# Patient Record
Sex: FEMALE | Race: WHITE | NOT HISPANIC OR LATINO
[De-identification: names, ages, dates, MRNs, and addresses within clinical notes are randomized per-mention and may not be internally consistent; named-entity substitution may affect disease eponyms.]

---

## 2019-04-24 ENCOUNTER — APPOINTMENT (OUTPATIENT)
Dept: ULTRASOUND IMAGING | Facility: CLINIC | Age: 59
End: 2019-04-24
Payer: MEDICAID

## 2019-04-24 ENCOUNTER — OUTPATIENT (OUTPATIENT)
Dept: OUTPATIENT SERVICES | Facility: HOSPITAL | Age: 59
LOS: 1 days | End: 2019-04-24

## 2019-04-24 PROBLEM — Z00.00 ENCOUNTER FOR PREVENTIVE HEALTH EXAMINATION: Status: ACTIVE | Noted: 2019-04-24

## 2019-04-24 PROCEDURE — 93979 VASCULAR STUDY: CPT

## 2019-05-03 ENCOUNTER — CLINICAL ADVICE (OUTPATIENT)
Age: 59
End: 2019-05-03

## 2019-05-06 ENCOUNTER — APPOINTMENT (OUTPATIENT)
Dept: CARDIOLOGY | Facility: CLINIC | Age: 59
End: 2019-05-06
Payer: MEDICAID

## 2019-05-06 VITALS
HEART RATE: 91 BPM | HEIGHT: 61 IN | OXYGEN SATURATION: 97 % | DIASTOLIC BLOOD PRESSURE: 70 MMHG | WEIGHT: 113 LBS | BODY MASS INDEX: 21.34 KG/M2 | SYSTOLIC BLOOD PRESSURE: 122 MMHG

## 2019-05-06 DIAGNOSIS — Z82.5 FAMILY HISTORY OF ASTHMA AND OTHER CHRONIC LOWER RESPIRATORY DISEASES: ICD-10-CM

## 2019-05-06 DIAGNOSIS — Z80.0 FAMILY HISTORY OF MALIGNANT NEOPLASM OF DIGESTIVE ORGANS: ICD-10-CM

## 2019-05-06 DIAGNOSIS — Z78.9 OTHER SPECIFIED HEALTH STATUS: ICD-10-CM

## 2019-05-06 PROCEDURE — 99244 OFF/OP CNSLTJ NEW/EST MOD 40: CPT

## 2019-05-06 RX ORDER — LEVOTHYROXINE SODIUM 0.05 MG/1
50 TABLET ORAL DAILY
Refills: 0 | Status: ACTIVE | COMMUNITY

## 2019-05-06 RX ORDER — BUDESONIDE AND FORMOTEROL FUMARATE DIHYDRATE 160; 4.5 UG/1; UG/1
160-4.5 AEROSOL RESPIRATORY (INHALATION)
Refills: 0 | Status: ACTIVE | COMMUNITY

## 2019-05-06 NOTE — PHYSICAL EXAM
[Normal Appearance] : normal appearance [General Appearance - Well Developed] : well developed [Well Groomed] : well groomed [General Appearance - Well Nourished] : well nourished [No Deformities] : no deformities [General Appearance - In No Acute Distress] : no acute distress [Normal Conjunctiva] : the conjunctiva exhibited no abnormalities [Eyelids - No Xanthelasma] : the eyelids demonstrated no xanthelasmas [Normal Oral Mucosa] : normal oral mucosa [No Oral Pallor] : no oral pallor [No Oral Cyanosis] : no oral cyanosis [Heart Sounds] : normal S1 and S2 [Heart Rate And Rhythm] : heart rate and rhythm were normal [Murmurs] : no murmurs present [Arterial Pulses Normal] : the arterial pulses were normal [Respiration, Rhythm And Depth] : normal respiratory rhythm and effort [Exaggerated Use Of Accessory Muscles For Inspiration] : no accessory muscle use [Auscultation Breath Sounds / Voice Sounds] : lungs were clear to auscultation bilaterally [Abdomen Soft] : soft [Abdomen Tenderness] : non-tender [Abnormal Walk] : normal gait [Gait - Sufficient For Exercise Testing] : the gait was sufficient for exercise testing [Cyanosis, Localized] : no localized cyanosis [Nail Clubbing] : no clubbing of the fingernails [Skin Color & Pigmentation] : normal skin color and pigmentation [] : no rash [No Venous Stasis] : no venous stasis [Oriented To Time, Place, And Person] : oriented to person, place, and time [Affect] : the affect was normal [Mood] : the mood was normal [No Anxiety] : not feeling anxious [FreeTextEntry1] : No Carotid bruit. No JVD

## 2019-05-06 NOTE — PHYSICAL EXAM
[General Appearance - Well Developed] : well developed [Normal Appearance] : normal appearance [Well Groomed] : well groomed [General Appearance - Well Nourished] : well nourished [No Deformities] : no deformities [General Appearance - In No Acute Distress] : no acute distress [Normal Conjunctiva] : the conjunctiva exhibited no abnormalities [Eyelids - No Xanthelasma] : the eyelids demonstrated no xanthelasmas [Normal Oral Mucosa] : normal oral mucosa [No Oral Pallor] : no oral pallor [No Oral Cyanosis] : no oral cyanosis [Heart Sounds] : normal S1 and S2 [Heart Rate And Rhythm] : heart rate and rhythm were normal [Murmurs] : no murmurs present [Arterial Pulses Normal] : the arterial pulses were normal [Respiration, Rhythm And Depth] : normal respiratory rhythm and effort [Exaggerated Use Of Accessory Muscles For Inspiration] : no accessory muscle use [Auscultation Breath Sounds / Voice Sounds] : lungs were clear to auscultation bilaterally [Abdomen Soft] : soft [Abnormal Walk] : normal gait [Abdomen Tenderness] : non-tender [Gait - Sufficient For Exercise Testing] : the gait was sufficient for exercise testing [Cyanosis, Localized] : no localized cyanosis [Nail Clubbing] : no clubbing of the fingernails [Skin Color & Pigmentation] : normal skin color and pigmentation [] : no rash [No Venous Stasis] : no venous stasis [Oriented To Time, Place, And Person] : oriented to person, place, and time [Mood] : the mood was normal [Affect] : the affect was normal [No Anxiety] : not feeling anxious [FreeTextEntry1] : No Carotid bruit. No JVD

## 2019-05-06 NOTE — REASON FOR VISIT
[FreeTextEntry1] : Mandy is a pleasant 59-year-old female with history of chronic smoking, COPD, well controlled on bronchodilators, hypothyroid on supplements and recent Holter recording showed up to 2 second pauses at night time. \par \par The patient does not have any palpitations, dizziness, near syncope or syncope. No past history of A. fib or sick sinus. No history of sleep apnea.\par \par Cardiac consultation was requested by Kael Lino NP

## 2019-05-17 ENCOUNTER — APPOINTMENT (OUTPATIENT)
Dept: CARDIOLOGY | Facility: CLINIC | Age: 59
End: 2019-05-17
Payer: MEDICAID

## 2019-05-17 PROCEDURE — 93015 CV STRESS TEST SUPVJ I&R: CPT

## 2019-05-17 PROCEDURE — 93306 TTE W/DOPPLER COMPLETE: CPT

## 2019-05-29 ENCOUNTER — APPOINTMENT (OUTPATIENT)
Dept: CARDIOLOGY | Facility: CLINIC | Age: 59
End: 2019-05-29
Payer: MEDICAID

## 2019-05-29 PROCEDURE — A9502: CPT

## 2019-05-29 PROCEDURE — 93015 CV STRESS TEST SUPVJ I&R: CPT

## 2019-05-29 PROCEDURE — 78452 HT MUSCLE IMAGE SPECT MULT: CPT

## 2019-05-29 RX ORDER — OXYCODONE HYDROCHLORIDE 5 MG/1
5 CAPSULE ORAL
Refills: 0 | Status: DISCONTINUED | COMMUNITY
End: 2019-05-29

## 2019-05-30 ENCOUNTER — APPOINTMENT (OUTPATIENT)
Dept: CARDIOLOGY | Facility: CLINIC | Age: 59
End: 2019-05-30
Payer: MEDICAID

## 2019-05-30 VITALS
WEIGHT: 113 LBS | HEIGHT: 61 IN | OXYGEN SATURATION: 97 % | HEART RATE: 72 BPM | BODY MASS INDEX: 21.34 KG/M2 | SYSTOLIC BLOOD PRESSURE: 116 MMHG | DIASTOLIC BLOOD PRESSURE: 60 MMHG

## 2019-05-30 PROCEDURE — 99215 OFFICE O/P EST HI 40 MIN: CPT

## 2019-05-30 NOTE — ASSESSMENT
[FreeTextEntry1] : Review today:\par \par -  Nuclear stress test May 2019: Exercise 4 minutes of Malvin protocol. EKG changes suggestive of ischemia. Small and mild apical lateral ischemia on SPECT scan\par - Normal LV function. RVSP 25. Interatrial septum aneurysm. Mild diastolic dysfunction.\par \par \par Plan:\par Chronic smoker, COPD: She has some shortness of breath on exertion. No recent COPD exacerbation.\par \par Coronary risk factors include hypercholesteremia and chronic smoking. No history of diabetes or hypertension.\par \par Holter recording showed sinus pause at nighttime, less than 3 seconds. Patient does not have symptoms of dizziness or near syncope. No palpitations.\par \par The above stress test and echocardiogram were discussed. Patient has abnormal EKG response to exercise which was limited, very small defect on the SPECT scan in the apical lateral region. Slight ischemia cannot be excluded. Patient is asymptomatic for angina.\par \par We discussed risk and benefits and limitations for  further evaluation and management with cardiac catheter and possible PCI versus medical therapy. The patient prefers medical therapy. \par \par She should be scheduled for hip surgery. \par \par She will start statins. Check LFT, LDL and CK after one month. Lab slip was given. Start low-dose metoprolol. Because of history of nighttime sinus pause, we will start at 12.5 mg. Continue aspirin.\par \par Quit smoking. Start nicotine patch. Smoking cessation is essential for prevention of future cardiovascular events.\par \par Overall, the patient is at mild-to-moderate risk for major cardiovascular events from the anticipated hip surgery. Medical treatment versus interventional management imbrication would yield similar long-term outcome as well as similar perioperative cardiovascular risk.\par \par \par \par Sincerely,\par Bishop Carbajal MD, FACC, SANDY

## 2019-05-30 NOTE — PHYSICAL EXAM
[General Appearance - Well Developed] : well developed [Normal Appearance] : normal appearance [Well Groomed] : well groomed [General Appearance - Well Nourished] : well nourished [No Deformities] : no deformities [General Appearance - In No Acute Distress] : no acute distress [Normal Conjunctiva] : the conjunctiva exhibited no abnormalities [Eyelids - No Xanthelasma] : the eyelids demonstrated no xanthelasmas [Normal Oral Mucosa] : normal oral mucosa [No Oral Pallor] : no oral pallor [No Oral Cyanosis] : no oral cyanosis [FreeTextEntry1] : No Carotid bruit. No JVD  [Respiration, Rhythm And Depth] : normal respiratory rhythm and effort [Exaggerated Use Of Accessory Muscles For Inspiration] : no accessory muscle use [Auscultation Breath Sounds / Voice Sounds] : lungs were clear to auscultation bilaterally [Heart Rate And Rhythm] : heart rate and rhythm were normal [Heart Sounds] : normal S1 and S2 [Murmurs] : no murmurs present [Arterial Pulses Normal] : the arterial pulses were normal [Abdomen Soft] : soft [Abdomen Tenderness] : non-tender [Abnormal Walk] : normal gait [Gait - Sufficient For Exercise Testing] : the gait was sufficient for exercise testing [Nail Clubbing] : no clubbing of the fingernails [Cyanosis, Localized] : no localized cyanosis [Skin Color & Pigmentation] : normal skin color and pigmentation [] : no rash [No Venous Stasis] : no venous stasis [Oriented To Time, Place, And Person] : oriented to person, place, and time [Affect] : the affect was normal [Mood] : the mood was normal [No Anxiety] : not feeling anxious

## 2019-05-30 NOTE — REASON FOR VISIT
[FreeTextEntry1] : Mandy is a pleasant 59-year-old female with history of chronic smoking, COPD, well controlled on bronchodilators, hypothyroid on supplements and recent Holter recording showed up to 2 second pauses at night time. \par \par The patient does not have any palpitations, dizziness, near syncope or syncope. No past history of A. fib or sick sinus. No history of sleep apnea.\par \par Cardiac consultation was requested by Kael Lino NP\par \par She had nuclear stress testing which was not normal.

## 2019-06-03 ENCOUNTER — APPOINTMENT (OUTPATIENT)
Dept: CARDIOLOGY | Facility: CLINIC | Age: 59
End: 2019-06-03

## 2019-06-03 ENCOUNTER — MEDICATION RENEWAL (OUTPATIENT)
Age: 59
End: 2019-06-03

## 2019-06-03 RX ORDER — ATORVASTATIN CALCIUM 20 MG/1
20 TABLET, FILM COATED ORAL DAILY
Qty: 90 | Refills: 0 | Status: DISCONTINUED | COMMUNITY
Start: 2019-05-30 | End: 2019-06-03

## 2019-06-06 ENCOUNTER — OUTPATIENT (OUTPATIENT)
Dept: OUTPATIENT SERVICES | Facility: HOSPITAL | Age: 59
LOS: 1 days | End: 2019-06-06
Payer: MEDICAID

## 2019-06-06 PROCEDURE — 93458 L HRT ARTERY/VENTRICLE ANGIO: CPT | Mod: 26

## 2019-06-07 PROCEDURE — 93010 ELECTROCARDIOGRAM REPORT: CPT

## 2019-06-10 ENCOUNTER — APPOINTMENT (OUTPATIENT)
Dept: CARDIOLOGY | Facility: CLINIC | Age: 59
End: 2019-06-10
Payer: MEDICAID

## 2019-06-10 ENCOUNTER — NON-APPOINTMENT (OUTPATIENT)
Age: 59
End: 2019-06-10

## 2019-06-10 VITALS
HEART RATE: 66 BPM | BODY MASS INDEX: 20.01 KG/M2 | DIASTOLIC BLOOD PRESSURE: 64 MMHG | HEIGHT: 61 IN | SYSTOLIC BLOOD PRESSURE: 118 MMHG | WEIGHT: 106 LBS

## 2019-06-10 DIAGNOSIS — Z01.810 ENCOUNTER FOR PREPROCEDURAL CARDIOVASCULAR EXAMINATION: ICD-10-CM

## 2019-06-10 PROCEDURE — 93000 ELECTROCARDIOGRAM COMPLETE: CPT

## 2019-06-10 PROCEDURE — 99214 OFFICE O/P EST MOD 30 MIN: CPT

## 2019-06-10 NOTE — ASSESSMENT
[FreeTextEntry1] : Review today:\par -  Nuclear stress test May 2019: Exercise 4 minutes of Malvin protocol. EKG changes suggestive of ischemia. Small and mild apical lateral ischemia on SPECT scan\par - Normal LV function. RVSP 25. Interatrial septum aneurysm. Mild diastolic dysfunction.\par \par \par Plan:\par Chronic smoker, COPD: She has some shortness of breath on exertion. No recent COPD exacerbation.\par \par Coronary risk factors include hypercholesteremia and chronic smoking. No history of diabetes or hypertension.\par \par Holter recording showed sinus pause at nighttime, less than 3 seconds. Patient does not have symptoms of dizziness or near syncope. No palpitations.\par \par Abnormal stress test. Very slight defect in the apical lateral region. Followup cardiac catheter showed minimal disease only.  Patient is asymptomatic for angina.\par \par She should be scheduled for hip surgery. \par \par She will start statins. Check LFT, LDL and CK after one month. Lab slip was given. \par \par DC Metoprolol.  Continue aspirin.\par \par Quit smoking. Start nicotine patch. Smoking cessation is essential for prevention of future cardiovascular events.\par \par ***  Overall, the patient is at mild  risk for major cardiovascular events from the anticipated hip surgery and Colonoscopy \par \par \par \par Sincerely,\par Bishop Carbajal MD, FACC, SANDY

## 2019-06-10 NOTE — PHYSICAL EXAM
[General Appearance - Well Developed] : well developed [Normal Appearance] : normal appearance [Well Groomed] : well groomed [General Appearance - Well Nourished] : well nourished [No Deformities] : no deformities [Normal Conjunctiva] : the conjunctiva exhibited no abnormalities [Eyelids - No Xanthelasma] : the eyelids demonstrated no xanthelasmas [General Appearance - In No Acute Distress] : no acute distress [Normal Oral Mucosa] : normal oral mucosa [No Oral Pallor] : no oral pallor [No Oral Cyanosis] : no oral cyanosis [FreeTextEntry1] : No Carotid bruit. No JVD  [Heart Rate And Rhythm] : heart rate and rhythm were normal [Auscultation Breath Sounds / Voice Sounds] : lungs were clear to auscultation bilaterally [Respiration, Rhythm And Depth] : normal respiratory rhythm and effort [Exaggerated Use Of Accessory Muscles For Inspiration] : no accessory muscle use [Arterial Pulses Normal] : the arterial pulses were normal [Murmurs] : no murmurs present [Heart Sounds] : normal S1 and S2 [Abdomen Tenderness] : non-tender [Abdomen Soft] : soft [Gait - Sufficient For Exercise Testing] : the gait was sufficient for exercise testing [Abnormal Walk] : normal gait [Cyanosis, Localized] : no localized cyanosis [Nail Clubbing] : no clubbing of the fingernails [No Venous Stasis] : no venous stasis [Skin Color & Pigmentation] : normal skin color and pigmentation [] : no rash [Oriented To Time, Place, And Person] : oriented to person, place, and time [Affect] : the affect was normal [Mood] : the mood was normal [No Anxiety] : not feeling anxious

## 2019-06-10 NOTE — REASON FOR VISIT
[FreeTextEntry1] : Dionna is a pleasant 59-year-old female with history of chronic smoking, COPD, well controlled on bronchodilators, hypothyroid on supplements and recent Holter recording showed up to 2 second pauses at night time. \par \par The patient does not have any palpitations, dizziness, near syncope or syncope. No past history of A. fib or sick sinus. No history of sleep apnea.\par \par She had nuclear stress testing which was not normal. Fortunately, subsequent cardiac catheter showed only minimal disease.\par \par She does not have any coronary or cardiovascular symptoms at this time

## 2019-07-03 ENCOUNTER — APPOINTMENT (OUTPATIENT)
Dept: CARDIOLOGY | Facility: CLINIC | Age: 59
End: 2019-07-03
Payer: MEDICAID

## 2019-07-03 ENCOUNTER — NON-APPOINTMENT (OUTPATIENT)
Age: 59
End: 2019-07-03

## 2019-07-03 VITALS
HEIGHT: 61 IN | HEART RATE: 78 BPM | SYSTOLIC BLOOD PRESSURE: 116 MMHG | DIASTOLIC BLOOD PRESSURE: 60 MMHG | BODY MASS INDEX: 19.63 KG/M2 | WEIGHT: 104 LBS | OXYGEN SATURATION: 98 %

## 2019-07-03 PROCEDURE — 93000 ELECTROCARDIOGRAM COMPLETE: CPT

## 2019-07-03 PROCEDURE — 99214 OFFICE O/P EST MOD 30 MIN: CPT

## 2019-07-03 RX ORDER — ATORVASTATIN CALCIUM 20 MG/1
20 TABLET, FILM COATED ORAL
Qty: 90 | Refills: 3 | Status: DISCONTINUED | COMMUNITY
End: 2019-07-03

## 2019-07-03 RX ORDER — METOPROLOL SUCCINATE 25 MG/1
25 TABLET, EXTENDED RELEASE ORAL DAILY
Qty: 45 | Refills: 0 | Status: DISCONTINUED | COMMUNITY
Start: 2019-05-30 | End: 2019-07-03

## 2019-07-03 RX ORDER — PREDNISONE 20 MG/1
20 TABLET ORAL
Qty: 10 | Refills: 0 | Status: DISCONTINUED | COMMUNITY
End: 2019-07-03

## 2019-07-03 RX ORDER — NICOTINE 14 MG/24H
14 PATCH TRANSDERMAL DAILY
Qty: 14 | Refills: 1 | Status: DISCONTINUED | COMMUNITY
Start: 2019-05-30 | End: 2019-07-03

## 2019-07-03 NOTE — PHYSICAL EXAM
[General Appearance - Well Developed] : well developed [Normal Appearance] : normal appearance [Well Groomed] : well groomed [General Appearance - Well Nourished] : well nourished [No Deformities] : no deformities [Normal Conjunctiva] : the conjunctiva exhibited no abnormalities [General Appearance - In No Acute Distress] : no acute distress [Normal Oral Mucosa] : normal oral mucosa [Eyelids - No Xanthelasma] : the eyelids demonstrated no xanthelasmas [FreeTextEntry1] : No Carotid bruit. No JVD  [No Oral Pallor] : no oral pallor [No Oral Cyanosis] : no oral cyanosis [Respiration, Rhythm And Depth] : normal respiratory rhythm and effort [Exaggerated Use Of Accessory Muscles For Inspiration] : no accessory muscle use [Auscultation Breath Sounds / Voice Sounds] : lungs were clear to auscultation bilaterally [Heart Rate And Rhythm] : heart rate and rhythm were normal [Murmurs] : no murmurs present [Heart Sounds] : normal S1 and S2 [Arterial Pulses Normal] : the arterial pulses were normal [Abdomen Soft] : soft [Abdomen Tenderness] : non-tender [Abnormal Walk] : normal gait [Nail Clubbing] : no clubbing of the fingernails [Gait - Sufficient For Exercise Testing] : the gait was sufficient for exercise testing [Cyanosis, Localized] : no localized cyanosis [] : no rash [Skin Color & Pigmentation] : normal skin color and pigmentation [Oriented To Time, Place, And Person] : oriented to person, place, and time [No Venous Stasis] : no venous stasis [Affect] : the affect was normal [Mood] : the mood was normal [No Anxiety] : not feeling anxious

## 2019-07-03 NOTE — ASSESSMENT
[FreeTextEntry1] : Review today:\par -  Nuclear stress test May 2019: Exercise 4 minutes of Malvin protocol. EKG changes suggestive of ischemia. Small and mild apical lateral ischemia on SPECT scan\par - Normal LV function. RVSP 25. Interatrial septum aneurysm. Mild diastolic dysfunction.\par - Cardiac catheter June 2019: Minimal disease \par - Labs July 2019: LDL 84.\par \par \par Plan:\par Chronic smoker, COPD: She has some shortness of breath on exertion. No recent COPD exacerbation.\par \par Coronary risk factors include hypercholesteremia and chronic smoking. No history of diabetes or hypertension.\par \par Holter recording showed sinus pause at nighttime, less than 3 seconds. Patient does not have symptoms of dizziness or near syncope. No palpitations. \par \par She has started statins. Check LFT, LDL and CK after one month. Lab slip was given. \par \par Abnormal stress test. Very slight defect in the apical lateral region. Followup cardiac catheter showed minimal disease only.  Patient is asymptomatic for angina.\par \par She is scheduled for hip surgery. The patient should be at mild-to-moderate risk for major cardiovascular events\par \par Metoprolol was DC'd.  Continue aspirin.\par \par Quit smoking. Start nicotine patch. Smoking cessation is essential for prevention of future cardiovascular events.\par \par *** During colonoscopy conscious sedation, she had transient bradycardia.. Overall, the patient is at mild  risk for major cardiovascular events from the anticipated hip surgery. Keep close watch on telemetry. Use of atropine for sinus bradycardia may be needed. Postop EKG should be checked. Continue current Medications\par \par \par \par Sincerely,\par Bishop Carbajal MD, FACC, SANDY

## 2019-07-03 NOTE — REASON FOR VISIT
[FreeTextEntry1] : Dionna is a pleasant 59-year-old female with history of chronic smoking, COPD, well controlled on bronchodilators, hypothyroid on supplements and recent Holter recording showed up to 2 second pauses at night time. \par \par The patient does not have any palpitations, dizziness, near syncope or syncope. No past history of A. fib or sick sinus. No history of sleep apnea.\par \par She underwent colonoscopy. She did have bradycardia with conscious sedation. She and is now scheduled for hip surgery.\par \par She had nuclear stress testing which was not normal. Fortunately, subsequent cardiac catheter showed only minimal disease.\par \par She does not have any coronary or cardiovascular symptoms at this time

## 2019-07-31 ENCOUNTER — EMERGENCY (EMERGENCY)
Facility: HOSPITAL | Age: 59
LOS: 1 days | End: 2019-07-31
Admitting: EMERGENCY MEDICINE
Payer: MEDICAID

## 2019-07-31 PROCEDURE — 99284 EMERGENCY DEPT VISIT MOD MDM: CPT

## 2019-07-31 PROCEDURE — 93010 ELECTROCARDIOGRAM REPORT: CPT

## 2019-08-02 ENCOUNTER — EMERGENCY (EMERGENCY)
Facility: HOSPITAL | Age: 59
LOS: 1 days | End: 2019-08-02
Admitting: EMERGENCY MEDICINE
Payer: MEDICAID

## 2019-08-02 PROCEDURE — 99284 EMERGENCY DEPT VISIT MOD MDM: CPT

## 2019-08-02 PROCEDURE — 76705 ECHO EXAM OF ABDOMEN: CPT | Mod: 26

## 2019-12-16 ENCOUNTER — APPOINTMENT (OUTPATIENT)
Dept: CARDIOLOGY | Facility: CLINIC | Age: 59
End: 2019-12-16
Payer: MEDICAID

## 2019-12-16 VITALS
DIASTOLIC BLOOD PRESSURE: 66 MMHG | HEIGHT: 61 IN | HEART RATE: 79 BPM | SYSTOLIC BLOOD PRESSURE: 120 MMHG | BODY MASS INDEX: 21.34 KG/M2 | OXYGEN SATURATION: 98 % | WEIGHT: 113 LBS

## 2019-12-16 DIAGNOSIS — F17.200 NICOTINE DEPENDENCE, UNSPECIFIED, UNCOMPLICATED: ICD-10-CM

## 2019-12-16 DIAGNOSIS — E03.9 HYPOTHYROIDISM, UNSPECIFIED: ICD-10-CM

## 2019-12-16 DIAGNOSIS — E78.00 PURE HYPERCHOLESTEROLEMIA, UNSPECIFIED: ICD-10-CM

## 2019-12-16 DIAGNOSIS — I45.5 OTHER SPECIFIED HEART BLOCK: ICD-10-CM

## 2019-12-16 DIAGNOSIS — R94.39 ABNORMAL RESULT OF OTHER CARDIOVASCULAR FUNCTION STUDY: ICD-10-CM

## 2019-12-16 DIAGNOSIS — J44.9 CHRONIC OBSTRUCTIVE PULMONARY DISEASE, UNSPECIFIED: ICD-10-CM

## 2019-12-16 PROCEDURE — 99214 OFFICE O/P EST MOD 30 MIN: CPT

## 2019-12-16 RX ORDER — ASPIRIN ENTERIC COATED TABLETS 81 MG 81 MG/1
81 TABLET, DELAYED RELEASE ORAL DAILY
Qty: 90 | Refills: 0 | Status: DISCONTINUED | COMMUNITY
Start: 2019-05-30 | End: 2019-12-16

## 2019-12-16 RX ORDER — CHOLECALCIFEROL (VITAMIN D3) 125 MCG
TABLET ORAL
Refills: 0 | Status: DISCONTINUED | COMMUNITY
End: 2019-12-16

## 2019-12-16 NOTE — DISCUSSION/SUMMARY
[FreeTextEntry1] : Plan:\par Chronic smoker, COPD: She has some shortness of breath on exertion. No recent COPD exacerbation. \par \par Coronary risk factors include hypercholesteremia and chronic smoking. No history of diabetes or hypertension.\par \par Holter recording showed sinus pause at nighttime, less than 3 seconds. Patient does not have symptoms of dizziness or near syncope. No palpitations. \par \par She has started statins. Check LFT, LDL and CK after 3 month. Lab slip was given. \par \par Abnormal stress test. Very slight defect in the apical lateral region. Followup cardiac catheter 2019 showed minimal disease only.  Patient is asymptomatic for angina.\par \par Metoprolol was DC'd.  Continue aspirin.\par \par Quit smoking. Start nicotine patch. Smoking cessation is essential for prevention of future cardiovascular events.\par \par *** During colonoscopy conscious sedation, she had transient bradycardia.. Overall, the patient is at mild  risk for major cardiovascular events from the anticipated hip surgery. Keep close watch on telemetry. Use of atropine for sinus bradycardia may be needed. Postop EKG should be checked. Continue current Medications\par \par \par \par Sincerely,\par Bishop Carbajal MD, FACC, SANDY \par \par \par \par \par

## 2019-12-16 NOTE — ASSESSMENT
[FreeTextEntry1] : Review today:\par -  Nuclear stress test May 2019: Exercise 4 minutes of Malvin protocol. EKG changes suggestive of ischemia. Small and mild apical lateral ischemia on SPECT scan\par - Normal LV function. RVSP 25. Interatrial septum aneurysm. Mild diastolic dysfunction.\par - Cardiac catheter June 2019: Minimal disease \par - Labs July 2019: LDL 84.\par \par \par

## 2019-12-16 NOTE — PHYSICAL EXAM
[General Appearance - Well Developed] : well developed [General Appearance - Well Nourished] : well nourished [Well Groomed] : well groomed [Normal Appearance] : normal appearance [Normal Conjunctiva] : the conjunctiva exhibited no abnormalities [No Deformities] : no deformities [General Appearance - In No Acute Distress] : no acute distress [Normal Oral Mucosa] : normal oral mucosa [Eyelids - No Xanthelasma] : the eyelids demonstrated no xanthelasmas [No Oral Cyanosis] : no oral cyanosis [No Oral Pallor] : no oral pallor [Respiration, Rhythm And Depth] : normal respiratory rhythm and effort [FreeTextEntry1] : No Carotid bruit. No JVD  [Exaggerated Use Of Accessory Muscles For Inspiration] : no accessory muscle use [Auscultation Breath Sounds / Voice Sounds] : lungs were clear to auscultation bilaterally [Heart Rate And Rhythm] : heart rate and rhythm were normal [Murmurs] : no murmurs present [Arterial Pulses Normal] : the arterial pulses were normal [Heart Sounds] : normal S1 and S2 [Abdomen Soft] : soft [Abdomen Tenderness] : non-tender [Gait - Sufficient For Exercise Testing] : the gait was sufficient for exercise testing [Abnormal Walk] : normal gait [Nail Clubbing] : no clubbing of the fingernails [Cyanosis, Localized] : no localized cyanosis [Skin Color & Pigmentation] : normal skin color and pigmentation [] : no rash [No Venous Stasis] : no venous stasis [Affect] : the affect was normal [Oriented To Time, Place, And Person] : oriented to person, place, and time [No Anxiety] : not feeling anxious [Mood] : the mood was normal

## 2019-12-16 NOTE — REASON FOR VISIT
[FreeTextEntry1] : Dionna is a pleasant 59-year-old female with history of chronic smoking, COPD, well controlled on bronchodilators, hypothyroid on supplements and Holter recording in 4/2019 showed up to 2 second pauses at night time. \par \par The patient does not have any palpitations, dizziness, near syncope or syncope. No past history of A. fib or sick sinus. No history of sleep apnea.\par \par She underwent colonoscopy. She did have bradycardia with conscious sedation. She had uneventful hip surgery.  She had a gallbladder attack afterwards With sludge in gallbladder.\par \par Preop, She had nuclear stress testing which was not normal. Fortunately, subsequent cardiac catheter showed only minimal disease.\par \par She does not have any coronary or cardiovascular symptoms at this time\par \par Continues to smoke

## 2020-07-09 ENCOUNTER — APPOINTMENT (OUTPATIENT)
Dept: CARDIOLOGY | Facility: CLINIC | Age: 60
End: 2020-07-09

## 2020-12-18 ENCOUNTER — APPOINTMENT (OUTPATIENT)
Dept: MAMMOGRAPHY | Facility: CLINIC | Age: 60
End: 2020-12-18
Payer: MEDICAID

## 2020-12-18 ENCOUNTER — APPOINTMENT (OUTPATIENT)
Dept: ULTRASOUND IMAGING | Facility: CLINIC | Age: 60
End: 2020-12-18
Payer: MEDICAID

## 2020-12-18 PROCEDURE — 76642 ULTRASOUND BREAST LIMITED: CPT | Mod: LT

## 2020-12-18 PROCEDURE — 77062 BREAST TOMOSYNTHESIS BI: CPT

## 2020-12-18 PROCEDURE — 77066 DX MAMMO INCL CAD BI: CPT

## 2022-01-04 ENCOUNTER — APPOINTMENT (OUTPATIENT)
Dept: MAMMOGRAPHY | Facility: CLINIC | Age: 62
End: 2022-01-04
Payer: MEDICAID

## 2022-01-04 ENCOUNTER — APPOINTMENT (OUTPATIENT)
Dept: RADIOLOGY | Facility: CLINIC | Age: 62
End: 2022-01-04

## 2022-01-04 PROCEDURE — 77067 SCR MAMMO BI INCL CAD: CPT

## 2022-01-04 PROCEDURE — 77063 BREAST TOMOSYNTHESIS BI: CPT

## 2022-01-04 PROCEDURE — 77085 DXA BONE DENSITY AXL VRT FX: CPT

## 2023-03-28 ENCOUNTER — APPOINTMENT (OUTPATIENT)
Dept: MAMMOGRAPHY | Facility: CLINIC | Age: 63
End: 2023-03-28

## 2023-03-28 ENCOUNTER — APPOINTMENT (OUTPATIENT)
Dept: CT IMAGING | Facility: CLINIC | Age: 63
End: 2023-03-28
Payer: MEDICAID

## 2023-03-28 ENCOUNTER — NON-APPOINTMENT (OUTPATIENT)
Age: 63
End: 2023-03-28

## 2023-03-28 VITALS — BODY MASS INDEX: 24.55 KG/M2 | WEIGHT: 130 LBS | HEIGHT: 61 IN

## 2023-03-28 DIAGNOSIS — F17.200 NICOTINE DEPENDENCE, UNSPECIFIED, UNCOMPLICATED: ICD-10-CM

## 2023-03-28 DIAGNOSIS — F17.210 NICOTINE DEPENDENCE, CIGARETTES, UNCOMPLICATED: ICD-10-CM

## 2023-03-28 PROCEDURE — 77067 SCR MAMMO BI INCL CAD: CPT

## 2023-03-28 PROCEDURE — 77063 BREAST TOMOSYNTHESIS BI: CPT

## 2023-03-28 PROCEDURE — 71271 CT THORAX LUNG CANCER SCR C-: CPT

## 2023-03-28 NOTE — HISTORY OF PRESENT ILLNESS
[Current] : Current [TextBox_13] : Referred by Kael Lino NP.\par \par Ms. YOUNG is a 63 year old female with a history of HLD and COPD.\par \par She was called to review eligibility for Low-Dose CT lung cancer screening.  Reviewed and confirmed that the patient meets screening eligibility criteria:\par \par 63 years old \par \par Smoking Status: Current Smoker\par \par Number of pack(s) per day: 1\par Number of years smoked: 50\par Number of pack years smokin\par \par No symptoms of lung cancer, including new cough, change in cough, hemoptysis, and unintentional weight loss.\par \par No personal history of lung cancer.  No lung cancer in a first degree relative.  No history of occupational exposures. [PacksperDay] : 1 [N_Years] : 50 [PacksperYear] : 50

## 2024-04-24 ENCOUNTER — APPOINTMENT (OUTPATIENT)
Dept: MAMMOGRAPHY | Facility: CLINIC | Age: 64
End: 2024-04-24
Payer: MEDICAID

## 2024-04-24 ENCOUNTER — APPOINTMENT (OUTPATIENT)
Dept: CT IMAGING | Facility: CLINIC | Age: 64
End: 2024-04-24

## 2024-04-24 PROCEDURE — 77063 BREAST TOMOSYNTHESIS BI: CPT

## 2024-04-24 PROCEDURE — 77067 SCR MAMMO BI INCL CAD: CPT

## 2024-04-24 PROCEDURE — 71250 CT THORAX DX C-: CPT

## 2024-06-18 ENCOUNTER — APPOINTMENT (OUTPATIENT)
Dept: RADIOLOGY | Facility: CLINIC | Age: 64
End: 2024-06-18